# Patient Record
Sex: MALE | Race: WHITE | NOT HISPANIC OR LATINO | Employment: OTHER | URBAN - METROPOLITAN AREA
[De-identification: names, ages, dates, MRNs, and addresses within clinical notes are randomized per-mention and may not be internally consistent; named-entity substitution may affect disease eponyms.]

---

## 2022-11-02 ENCOUNTER — CONSULT (OUTPATIENT)
Dept: ONCOLOGY | Facility: CLINIC | Age: 82
End: 2022-11-02

## 2022-11-02 ENCOUNTER — TELEPHONE (OUTPATIENT)
Dept: ONCOLOGY | Facility: CLINIC | Age: 82
End: 2022-11-02

## 2022-11-02 ENCOUNTER — LAB (OUTPATIENT)
Dept: LAB | Facility: HOSPITAL | Age: 82
End: 2022-11-02

## 2022-11-02 VITALS
WEIGHT: 189 LBS | HEIGHT: 71 IN | BODY MASS INDEX: 26.46 KG/M2 | OXYGEN SATURATION: 98 % | TEMPERATURE: 97.3 F | DIASTOLIC BLOOD PRESSURE: 69 MMHG | RESPIRATION RATE: 24 BRPM | SYSTOLIC BLOOD PRESSURE: 112 MMHG | HEART RATE: 70 BPM

## 2022-11-02 DIAGNOSIS — D69.6 THROMBOCYTOPENIA: ICD-10-CM

## 2022-11-02 DIAGNOSIS — D69.6 THROMBOCYTOPENIA: Primary | ICD-10-CM

## 2022-11-02 PROBLEM — I95.1 ORTHOSTATIC HYPOTENSION: Status: ACTIVE | Noted: 2022-11-02

## 2022-11-02 PROBLEM — N40.0 BENIGN PROSTATIC HYPERPLASIA WITHOUT LOWER URINARY TRACT SYMPTOMS: Status: ACTIVE | Noted: 2022-11-02

## 2022-11-02 PROBLEM — E11.9 TYPE 2 DIABETES MELLITUS WITHOUT COMPLICATION, WITHOUT LONG-TERM CURRENT USE OF INSULIN: Status: ACTIVE | Noted: 2022-11-02

## 2022-11-02 PROBLEM — I10 PRIMARY HYPERTENSION: Status: ACTIVE | Noted: 2022-11-02

## 2022-11-02 LAB
BASOPHILS # BLD AUTO: 0.05 10*3/MM3 (ref 0–0.2)
BASOPHILS NFR BLD AUTO: 0.7 % (ref 0–1.5)
DEPRECATED RDW RBC AUTO: 48.7 FL (ref 37–54)
EOSINOPHIL # BLD AUTO: 0.42 10*3/MM3 (ref 0–0.4)
EOSINOPHIL NFR BLD AUTO: 5.5 % (ref 0.3–6.2)
ERYTHROCYTE [DISTWIDTH] IN BLOOD BY AUTOMATED COUNT: 14.5 % (ref 12.3–15.4)
FERRITIN SERPL-MCNC: 34.27 NG/ML (ref 30–400)
HCT VFR BLD AUTO: 40.9 % (ref 37.5–51)
HGB BLD-MCNC: 13.6 G/DL (ref 13–17.7)
IMM GRANULOCYTES # BLD AUTO: 0.05 10*3/MM3 (ref 0–0.05)
IMM GRANULOCYTES NFR BLD AUTO: 0.7 % (ref 0–0.5)
IRON 24H UR-MRATE: 116 MCG/DL (ref 59–158)
IRON SATN MFR SERPL: 31 % (ref 20–50)
LYMPHOCYTES # BLD AUTO: 0.72 10*3/MM3 (ref 0.7–3.1)
LYMPHOCYTES NFR BLD AUTO: 9.4 % (ref 19.6–45.3)
MCH RBC QN AUTO: 29.9 PG (ref 26.6–33)
MCHC RBC AUTO-ENTMCNC: 33.3 G/DL (ref 31.5–35.7)
MCV RBC AUTO: 89.9 FL (ref 79–97)
MONOCYTES # BLD AUTO: 0.73 10*3/MM3 (ref 0.1–0.9)
MONOCYTES NFR BLD AUTO: 9.5 % (ref 5–12)
NEUTROPHILS NFR BLD AUTO: 5.68 10*3/MM3 (ref 1.7–7)
NEUTROPHILS NFR BLD AUTO: 74.2 % (ref 42.7–76)
PLATELET # BLD AUTO: 189 10*3/MM3 (ref 140–450)
PMV BLD AUTO: 10.3 FL (ref 6–12)
RBC # BLD AUTO: 4.55 10*6/MM3 (ref 4.14–5.8)
TIBC SERPL-MCNC: 380 MCG/DL (ref 298–536)
TRANSFERRIN SERPL-MCNC: 255 MG/DL (ref 200–360)
WBC NRBC COR # BLD: 7.65 10*3/MM3 (ref 3.4–10.8)

## 2022-11-02 PROCEDURE — 36415 COLL VENOUS BLD VENIPUNCTURE: CPT

## 2022-11-02 PROCEDURE — 85025 COMPLETE CBC W/AUTO DIFF WBC: CPT

## 2022-11-02 PROCEDURE — 83540 ASSAY OF IRON: CPT

## 2022-11-02 PROCEDURE — 84466 ASSAY OF TRANSFERRIN: CPT

## 2022-11-02 PROCEDURE — 86038 ANTINUCLEAR ANTIBODIES: CPT

## 2022-11-02 PROCEDURE — 82728 ASSAY OF FERRITIN: CPT

## 2022-11-02 PROCEDURE — 99203 OFFICE O/P NEW LOW 30 MIN: CPT | Performed by: INTERNAL MEDICINE

## 2022-11-02 PROCEDURE — 82607 VITAMIN B-12: CPT

## 2022-11-02 PROCEDURE — 85049 AUTOMATED PLATELET COUNT: CPT

## 2022-11-02 RX ORDER — ATORVASTATIN CALCIUM 80 MG/1
TABLET, FILM COATED ORAL
COMMUNITY
Start: 2022-10-25

## 2022-11-02 RX ORDER — MULTIPLE VITAMINS W/ MINERALS TAB 9MG-400MCG
1 TAB ORAL DAILY
COMMUNITY

## 2022-11-02 RX ORDER — ASPIRIN 81 MG/1
TABLET, COATED ORAL
COMMUNITY
Start: 2022-09-23

## 2022-11-02 RX ORDER — FLUDROCORTISONE ACETATE 0.1 MG/1
TABLET ORAL
COMMUNITY
Start: 2022-10-17

## 2022-11-02 RX ORDER — FINASTERIDE 5 MG/1
TABLET, FILM COATED ORAL
COMMUNITY

## 2022-11-02 RX ORDER — CHLORAL HYDRATE 500 MG
CAPSULE ORAL
COMMUNITY

## 2022-11-02 RX ORDER — PANTOPRAZOLE SODIUM 40 MG/1
TABLET, DELAYED RELEASE ORAL
COMMUNITY
Start: 2022-10-25

## 2022-11-02 RX ORDER — AMLODIPINE BESYLATE 10 MG/1
TABLET ORAL
COMMUNITY
Start: 2022-10-25

## 2022-11-02 RX ORDER — AMITRIPTYLINE HYDROCHLORIDE 10 MG/1
TABLET, FILM COATED ORAL
COMMUNITY
Start: 2022-10-24

## 2022-11-02 RX ORDER — METOPROLOL SUCCINATE 100 MG/1
TABLET, EXTENDED RELEASE ORAL
COMMUNITY

## 2022-11-02 RX ORDER — MULTIVIT WITH MINERALS/LUTEIN
1000 TABLET ORAL DAILY
COMMUNITY

## 2022-11-02 RX ORDER — THIAMINE HCL 100 MG
TABLET ORAL
COMMUNITY

## 2022-11-02 RX ORDER — HYDROCHLOROTHIAZIDE 12.5 MG/1
TABLET ORAL
COMMUNITY
Start: 2022-08-12

## 2022-11-02 RX ORDER — LABETALOL 100 MG/1
TABLET, FILM COATED ORAL
COMMUNITY
Start: 2022-10-14

## 2022-11-02 RX ORDER — GLIPIZIDE 5 MG/1
TABLET, EXTENDED RELEASE ORAL
COMMUNITY
Start: 2022-10-25

## 2022-11-02 NOTE — TELEPHONE ENCOUNTER
Called patient and spoke to family member. Informing her that labs came back looking good so he doesn't need to follow up with Dr. Lagunas. Patient's family asked what lab was for informing her that labs looked at plt count and the results are fine.

## 2022-11-02 NOTE — PROGRESS NOTES
ID: 81 y.o. year old male from [place] [address]    PCP: Liz Troy PA-C    REFERRING PHYSICIAN: Liz Troy PA-C    Reason for Consultation: Mild thrombocytopenia    Dear Sydni    It is a pleasure to meet Mr. Hinds today.  He is a very pleasant 81-year-old gentleman who presents today for consultation for mild thrombocytopenia.  He has underlying diabetes and some orthostatic hypotension but otherwise seems to be in reasonable health.  Denies any recent infections.  No underlying autoimmune disorders.            Past Medical History:   Diagnosis Date   • Benign prostatic hyperplasia without lower urinary tract symptoms 11/2/2022   • Primary hypertension 11/2/2022   • Thrombocytopenia (HCC) 11/2/2022   • Type 2 diabetes mellitus without complication, without long-term current use of insulin (HCC) 11/2/2022       Past Surgical History:   Procedure Laterality Date   • BACK SURGERY  2015   • COLON SURGERY  2008   • COLON SURGERY  2009   • COLONOSCOPY  2009   • LEG SURGERY  2011    broken leg   • NERVE REPAIR  2009    Arm   • PROSTATE SURGERY  04/2022       Social History     Socioeconomic History   • Marital status: Single   Tobacco Use   • Smoking status: Never   • Smokeless tobacco: Never   Vaping Use   • Vaping Use: Never used   Substance and Sexual Activity   • Alcohol use: Never   • Drug use: Never   • Sexual activity: Defer       Family History   Problem Relation Age of Onset   • Hypertension Mother    • Diabetes Mother    • Arthritis Mother    • Heart disease Mother    • Cancer Father    • Arthritis Father    • Prostate cancer Father    • Cancer Sister    • Hyperlipidemia Sister    • Brain cancer Sister    • Cancer Sister    • Pancreatic cancer Sister    • Diabetes Brother    • Cancer Brother    • Colon cancer Brother        Review of Systems:    16 point review of systems was performed and reviewed and scanned into the EMR    Review of Systems - Oncology      Current Outpatient Medications:   •   amitriptyline (ELAVIL) 10 MG tablet, , Disp: , Rfl:   •  amLODIPine (NORVASC) 10 MG tablet, , Disp: , Rfl:   •  Ascorbic Acid (VITAMIN C PO), Take  by mouth., Disp: , Rfl:   •  Aspirin Low Dose 81 MG EC tablet, TAKE ONE TABLET BY MOUTH DAILY AT 9 AM (VIAL), Disp: , Rfl:   •  atorvastatin (LIPITOR) 80 MG tablet, , Disp: , Rfl:   •  B Complex Vitamins (VITAMIN B COMPLEX PO), Take  by mouth., Disp: , Rfl:   •  CHROMIUM PICOLINATE PO, Take  by mouth., Disp: , Rfl:   •  Coenzyme Q10 (COQ-10 PO), Take  by mouth., Disp: , Rfl:   •  finasteride (PROSCAR) 5 MG tablet, finasteride 5 mg tablet, Disp: , Rfl:   •  fludrocortisone 0.1 MG tablet, , Disp: , Rfl:   •  glipiZIDE XL 5 MG ER tablet, , Disp: , Rfl:   •  GRAPE SEED ER PO, Take  by mouth., Disp: , Rfl:   •  hydroCHLOROthiazide (HYDRODIURIL) 12.5 MG tablet, , Disp: , Rfl:   •  labetalol (NORMODYNE) 100 MG tablet, , Disp: , Rfl:   •  Magnesium 500 MG tablet, Take  by mouth., Disp: , Rfl:   •  metFORMIN (GLUCOPHAGE) 500 MG tablet, , Disp: , Rfl:   •  metoprolol succinate XL (TOPROL-XL) 100 MG 24 hr tablet, metoprolol succinate  mg tablet,extended release 24 hr, Disp: , Rfl:   •  multivitamin with minerals (CENTRUM PO), Take 1 tablet by mouth Daily., Disp: , Rfl:   •  OLIVE LEAF EXTRACT PO, Take  by mouth., Disp: , Rfl:   •  Omega-3 Fatty Acids (fish oil) 1000 MG capsule capsule, omega-3 acid ethyl esters 1 gram capsule, Disp: , Rfl:   •  pantoprazole (PROTONIX) 40 MG EC tablet, , Disp: , Rfl:   •  Selenium (SELENIMIN PO), Take  by mouth., Disp: , Rfl:   •  vitamin D3 125 MCG (5000 UT) capsule capsule, Take 1 capsule by mouth Daily., Disp: , Rfl:   •  vitamin E 1000 UNIT capsule, Take 1 capsule by mouth Daily., Disp: , Rfl:     Pain Medications             amitriptyline (ELAVIL) 10 MG tablet     Aspirin Low Dose 81 MG EC tablet TAKE ONE TABLET BY MOUTH DAILY AT 9 AM (VIAL)    fludrocortisone 0.1 MG tablet            Allergies   Allergen Reactions   • Naproxen Nausea And  Vomiting         ECOG score: 1           Objective     Vitals:    11/02/22 1429   BP: 112/69   Pulse: 70   Resp: 24   Temp: 97.3 °F (36.3 °C)   SpO2: 98%     Body mass index is 26.36 kg/m².  Body surface area is 2.06 meters squared.        11/02/22  1429   Weight: 85.7 kg (189 lb)     Pain Score    11/02/22 1429   PainSc: 0-No pain          Physical Exam    General: well appearing, in no acute distress  HEENT: sclera anicteric, oropharynx clear, neck is supple  Lymphatics: no cervical, supraclavicular, or axillary adenopathy  Cardiovascular: regular rate and rhythm, no murmurs, rubs or gallops  Lungs: clear to auscultation bilaterally  Abdomen: soft, nontender, nondistended.  No palpable organomegaly  Extremities: no lower extremity edema  Skin: no rashes, lesions, bruising, or petechiae  Msk:  Shows no weakness of the large muscle groups  Psych: Mood is stable        Lab Results   Component Value Date    BUN 18 10/17/2018    CREATININE 1.0 10/17/2018     (L) 10/17/2018    K 4.1 10/17/2018    CL 97 (L) 10/17/2018    CO2 26 10/17/2018    CALCIUM 8.9 10/17/2018    PROTEINTOT 6.9 10/12/2018    ALBUMIN 4.2 10/12/2018    BILITOT 0.5 10/12/2018    ALKPHOS 68 10/12/2018    AST 10 10/12/2018    ALT 10 10/12/2018       Lab Results   Component Value Date    HGB 13.6 11/02/2022    HCT 40.9 11/02/2022    MCV 89.9 11/02/2022     11/02/2022    WBC 7.65 11/02/2022    NEUTROABS 5.68 11/02/2022    LYMPHSABS 0.72 11/02/2022    MONOSABS 0.73 11/02/2022    EOSABS 0.42 (H) 11/02/2022    BASOSABS 0.05 11/02/2022           Assessment      1.  Mild thrombocytopenia.  This appears to be transient in nature.  Repeat testing here showed a normal count of platelets.  Likely due to either a recent viral illness or lab error.  At this point I do not recommend any further testing.  Routine follow-up.  His platelet counts even prior to this were fairly normal and the most recent lab work was just mildly depressed to the point where it  would not be clinically significant.      Thank you for allowing me to participate in the care of this patient.    Yours sincerely,    Rene Gonzales MD  [facility]  Hematology and Oncology    Return in (Approximately): 6 months    Orders Placed This Encounter   Procedures   • Vitamin B12     Standing Status:   Future     Number of Occurrences:   1     Standing Expiration Date:   11/2/2023     Order Specific Question:   Release to patient     Answer:   Routine Release   • Ferritin     Standing Status:   Future     Number of Occurrences:   1     Standing Expiration Date:   11/2/2023     Order Specific Question:   Release to patient     Answer:   Routine Release   • Iron Profile     Standing Status:   Future     Number of Occurrences:   1     Standing Expiration Date:   11/2/2023     Order Specific Question:   Release to patient     Answer:   Routine Release   • DIANA     Standing Status:   Future     Number of Occurrences:   1     Standing Expiration Date:   11/2/2023     Order Specific Question:   Release to patient     Answer:   Routine Release   • Platelet Count On Heparinized Blood     Standing Status:   Future     Number of Occurrences:   1     Standing Expiration Date:   11/2/2023     Order Specific Question:   Release to patient     Answer:   Routine Release   • CBC & Differential     Standing Status:   Future     Number of Occurrences:   1     Standing Expiration Date:   11/2/2023     Order Specific Question:   Manual Differential     Answer:   No     Order Specific Question:   Release to patient     Answer:   Routine Release   • CBC & Differential     Standing Status:   Future     Standing Expiration Date:   11/2/2023     Order Specific Question:   Manual Differential     Answer:   No     Order Specific Question:   Release to patient     Answer:   Routine Release

## 2022-11-03 LAB
PLATELET # BLD AUTO: 134 10*3/MM3 (ref 140–450)
VIT B12 BLD-MCNC: 528 PG/ML (ref 211–946)

## 2022-11-04 LAB — ANA SER QL: NEGATIVE

## 2022-11-17 ENCOUNTER — TELEPHONE (OUTPATIENT)
Dept: ONCOLOGY | Facility: CLINIC | Age: 82
End: 2022-11-17

## 2022-11-17 NOTE — TELEPHONE ENCOUNTER
Caller: EMILI    Relationship: OFFICE OF ARTUR KNUTSON, REFERRING PROVIDER    Best call back number:[phone]    What form or medical record are you requesting: LAST OFFICE NOTE    How would you like to receive the form or medical records (pick-up, mail, fax): FX# [phone]